# Patient Record
Sex: MALE | URBAN - METROPOLITAN AREA
[De-identification: names, ages, dates, MRNs, and addresses within clinical notes are randomized per-mention and may not be internally consistent; named-entity substitution may affect disease eponyms.]

---

## 2017-08-28 PROBLEM — Z30.09 CONSULTATION FOR STERILIZATION: Status: ACTIVE | Noted: 2017-08-28

## 2018-02-01 ENCOUNTER — APPOINTMENT (OUTPATIENT)
Dept: GENERAL RADIOLOGY | Age: 43
DRG: 897 | End: 2018-02-01
Payer: COMMERCIAL

## 2018-02-01 ENCOUNTER — APPOINTMENT (OUTPATIENT)
Dept: CT IMAGING | Age: 43
DRG: 897 | End: 2018-02-01
Payer: COMMERCIAL

## 2018-02-01 ENCOUNTER — HOSPITAL ENCOUNTER (INPATIENT)
Age: 43
LOS: 1 days | Discharge: HOME | DRG: 897 | End: 2018-02-01
Attending: EMERGENCY MEDICINE | Admitting: INTERNAL MEDICINE
Payer: COMMERCIAL

## 2018-02-01 VITALS
RESPIRATION RATE: 19 BRPM | TEMPERATURE: 98.1 F | BODY MASS INDEX: 24.61 KG/M2 | DIASTOLIC BLOOD PRESSURE: 60 MMHG | SYSTOLIC BLOOD PRESSURE: 126 MMHG | OXYGEN SATURATION: 96 % | WEIGHT: 181.44 LBS | HEART RATE: 96 BPM

## 2018-02-01 DIAGNOSIS — R74.8 ELEVATED CK: ICD-10-CM

## 2018-02-01 DIAGNOSIS — G93.89 CEREBELLAR MASS: ICD-10-CM

## 2018-02-01 DIAGNOSIS — R41.82 ALTERED MENTAL STATUS, UNSPECIFIED ALTERED MENTAL STATUS TYPE: ICD-10-CM

## 2018-02-01 DIAGNOSIS — R79.89 ELEVATED SERUM CREATININE: Primary | ICD-10-CM

## 2018-02-01 DIAGNOSIS — F10.920 ACUTE ALCOHOLIC INTOXICATION WITHOUT COMPLICATION (HCC): ICD-10-CM

## 2018-02-01 PROBLEM — R45.6 VIOLENT BEHAVIOR: Status: ACTIVE | Noted: 2018-02-01

## 2018-02-01 PROBLEM — F10.221 ACUTE ALCOHOL INTOXICATION DELIRIUM WITH MODERATE OR SEVERE USE DISORDER (HCC): Status: ACTIVE | Noted: 2018-02-01

## 2018-02-01 LAB
ABSOLUTE EOS #: 0.16 K/UL (ref 0–0.44)
ABSOLUTE IMMATURE GRANULOCYTE: <0.03 K/UL (ref 0–0.3)
ABSOLUTE LYMPH #: 3.84 K/UL (ref 1.1–3.7)
ABSOLUTE MONO #: 0.62 K/UL (ref 0.1–1.2)
ACETAMINOPHEN LEVEL: <10 UG/ML (ref 10–30)
ALBUMIN SERPL-MCNC: 4.5 G/DL (ref 3.5–5.2)
ALBUMIN SERPL-MCNC: 4.9 G/DL (ref 3.5–5.2)
ALBUMIN/GLOBULIN RATIO: 1.5 (ref 1–2.5)
ALBUMIN/GLOBULIN RATIO: 2.1 (ref 1–2.5)
ALP BLD-CCNC: 44 U/L (ref 40–129)
ALP BLD-CCNC: 52 U/L (ref 40–129)
ALT SERPL-CCNC: 42 U/L (ref 5–41)
ALT SERPL-CCNC: 52 U/L (ref 5–41)
AMMONIA: 50 UMOL/L (ref 16–60)
AMPHETAMINE SCREEN URINE: NEGATIVE
ANION GAP SERPL CALCULATED.3IONS-SCNC: 13 MMOL/L (ref 9–17)
ANION GAP SERPL CALCULATED.3IONS-SCNC: 17 MMOL/L (ref 9–17)
AST SERPL-CCNC: 60 U/L
AST SERPL-CCNC: 89 U/L
BARBITURATE SCREEN URINE: NEGATIVE
BASOPHILS # BLD: 1 % (ref 0–2)
BASOPHILS ABSOLUTE: 0.04 K/UL (ref 0–0.2)
BENZODIAZEPINE SCREEN, URINE: NEGATIVE
BILIRUB SERPL-MCNC: 0.15 MG/DL (ref 0.3–1.2)
BILIRUB SERPL-MCNC: 0.21 MG/DL (ref 0.3–1.2)
BUN BLDV-MCNC: 16 MG/DL (ref 6–20)
BUN BLDV-MCNC: 19 MG/DL (ref 6–20)
BUN/CREAT BLD: ABNORMAL (ref 9–20)
BUN/CREAT BLD: ABNORMAL (ref 9–20)
BUPRENORPHINE URINE: NORMAL
CALCIUM SERPL-MCNC: 8.4 MG/DL (ref 8.6–10.4)
CALCIUM SERPL-MCNC: 9.1 MG/DL (ref 8.6–10.4)
CANNABINOID SCREEN URINE: NEGATIVE
CHLORIDE BLD-SCNC: 103 MMOL/L (ref 98–107)
CHLORIDE BLD-SCNC: 107 MMOL/L (ref 98–107)
CO2: 21 MMOL/L (ref 20–31)
CO2: 24 MMOL/L (ref 20–31)
COCAINE METABOLITE, URINE: NEGATIVE
CREAT SERPL-MCNC: 1.08 MG/DL (ref 0.7–1.2)
CREAT SERPL-MCNC: 1.38 MG/DL (ref 0.7–1.2)
DIFFERENTIAL TYPE: ABNORMAL
EKG ATRIAL RATE: 95 BPM
EKG P AXIS: 72 DEGREES
EKG P-R INTERVAL: 164 MS
EKG Q-T INTERVAL: 352 MS
EKG QRS DURATION: 96 MS
EKG QTC CALCULATION (BAZETT): 442 MS
EKG R AXIS: 43 DEGREES
EKG T AXIS: 52 DEGREES
EKG VENTRICULAR RATE: 95 BPM
EOSINOPHILS RELATIVE PERCENT: 2 % (ref 1–4)
ESTIMATED AVERAGE GLUCOSE: 117 MG/DL
ETHANOL PERCENT: 0.23 %
ETHANOL PERCENT: 0.31 %
ETHANOL: 226 MG/DL
ETHANOL: 306 MG/DL
GFR AFRICAN AMERICAN: >60 ML/MIN
GFR AFRICAN AMERICAN: >60 ML/MIN
GFR NON-AFRICAN AMERICAN: 57 ML/MIN
GFR NON-AFRICAN AMERICAN: >60 ML/MIN
GFR SERPL CREATININE-BSD FRML MDRD: ABNORMAL ML/MIN/{1.73_M2}
GLUCOSE BLD-MCNC: 117 MG/DL (ref 70–99)
GLUCOSE BLD-MCNC: 92 MG/DL (ref 70–99)
HBA1C MFR BLD: 5.7 % (ref 4–6)
HCT VFR BLD CALC: 41.9 % (ref 40.7–50.3)
HCT VFR BLD CALC: 47.7 % (ref 40.7–50.3)
HEMOGLOBIN: 14 G/DL (ref 13–17)
HEMOGLOBIN: 15.8 G/DL (ref 13–17)
IMMATURE GRANULOCYTES: 0 %
INR BLD: 1
INR BLD: 1
LACTIC ACID, WHOLE BLOOD: 2.5 MMOL/L (ref 0.7–2.1)
LACTIC ACID, WHOLE BLOOD: 4.2 MMOL/L (ref 0.7–2.1)
LYMPHOCYTES # BLD: 44 % (ref 24–43)
MCH RBC QN AUTO: 28.6 PG (ref 25.2–33.5)
MCH RBC QN AUTO: 29 PG (ref 25.2–33.5)
MCHC RBC AUTO-ENTMCNC: 33.1 G/DL (ref 28.4–34.8)
MCHC RBC AUTO-ENTMCNC: 33.4 G/DL (ref 28.4–34.8)
MCV RBC AUTO: 86.4 FL (ref 82.6–102.9)
MCV RBC AUTO: 86.7 FL (ref 82.6–102.9)
MDMA URINE: NORMAL
METHADONE SCREEN, URINE: NEGATIVE
METHAMPHETAMINE, URINE: NORMAL
MONOCYTES # BLD: 7 % (ref 3–12)
MYOGLOBIN: 380 NG/ML (ref 28–72)
MYOGLOBIN: 407 NG/ML (ref 28–72)
NRBC AUTOMATED: 0 PER 100 WBC
NRBC AUTOMATED: 0 PER 100 WBC
OPIATES, URINE: NEGATIVE
OXYCODONE SCREEN URINE: NEGATIVE
PDW BLD-RTO: 12.9 % (ref 11.8–14.4)
PDW BLD-RTO: 12.9 % (ref 11.8–14.4)
PHENCYCLIDINE, URINE: NEGATIVE
PHOSPHORUS: 3.4 MG/DL (ref 2.5–4.5)
PLATELET # BLD: 250 K/UL (ref 138–453)
PLATELET # BLD: 322 K/UL (ref 138–453)
PLATELET ESTIMATE: ABNORMAL
PMV BLD AUTO: 9.3 FL (ref 8.1–13.5)
PMV BLD AUTO: 9.6 FL (ref 8.1–13.5)
POC TROPONIN I: 0 NG/ML (ref 0–0.1)
POC TROPONIN INTERP: NORMAL
POTASSIUM SERPL-SCNC: 4.4 MMOL/L (ref 3.7–5.3)
POTASSIUM SERPL-SCNC: 5.3 MMOL/L (ref 3.7–5.3)
PROPOXYPHENE, URINE: NORMAL
PROTHROMBIN TIME: 10.6 SEC (ref 9.4–12.6)
PROTHROMBIN TIME: 10.9 SEC (ref 9.4–12.6)
RBC # BLD: 4.83 M/UL (ref 4.21–5.77)
RBC # BLD: 5.52 M/UL (ref 4.21–5.77)
RBC # BLD: ABNORMAL 10*6/UL
SALICYLATE LEVEL: <1 MG/DL (ref 3–10)
SEG NEUTROPHILS: 46 % (ref 36–65)
SEGMENTED NEUTROPHILS ABSOLUTE COUNT: 4.04 K/UL (ref 1.5–8.1)
SODIUM BLD-SCNC: 141 MMOL/L (ref 135–144)
SODIUM BLD-SCNC: 144 MMOL/L (ref 135–144)
TEST INFORMATION: NORMAL
TOTAL CK: 1809 U/L (ref 39–308)
TOTAL CK: 1945 U/L (ref 39–308)
TOTAL PROTEIN: 6.6 G/DL (ref 6.4–8.3)
TOTAL PROTEIN: 8.2 G/DL (ref 6.4–8.3)
TOXIC TRICYCLIC SC,BLOOD: NEGATIVE
TRICYCLIC ANTIDEPRESSANTS, UR: NORMAL
TROPONIN INTERP: NORMAL
TROPONIN T: <0.03 NG/ML
TSH SERPL DL<=0.05 MIU/L-ACNC: 2.31 MIU/L (ref 0.3–5)
WBC # BLD: 11 K/UL (ref 3.5–11.3)
WBC # BLD: 8.7 K/UL (ref 3.5–11.3)
WBC # BLD: ABNORMAL 10*3/UL

## 2018-02-01 PROCEDURE — 6360000002 HC RX W HCPCS: Performed by: EMERGENCY MEDICINE

## 2018-02-01 PROCEDURE — 93005 ELECTROCARDIOGRAM TRACING: CPT

## 2018-02-01 PROCEDURE — 2580000003 HC RX 258: Performed by: EMERGENCY MEDICINE

## 2018-02-01 PROCEDURE — G0480 DRUG TEST DEF 1-7 CLASSES: HCPCS

## 2018-02-01 PROCEDURE — 80307 DRUG TEST PRSMV CHEM ANLYZR: CPT

## 2018-02-01 PROCEDURE — 83605 ASSAY OF LACTIC ACID: CPT

## 2018-02-01 PROCEDURE — 83036 HEMOGLOBIN GLYCOSYLATED A1C: CPT

## 2018-02-01 PROCEDURE — 99285 EMERGENCY DEPT VISIT HI MDM: CPT

## 2018-02-01 PROCEDURE — 99222 1ST HOSP IP/OBS MODERATE 55: CPT | Performed by: INTERNAL MEDICINE

## 2018-02-01 PROCEDURE — 70450 CT HEAD/BRAIN W/O DYE: CPT

## 2018-02-01 PROCEDURE — 6370000000 HC RX 637 (ALT 250 FOR IP): Performed by: NURSE PRACTITIONER

## 2018-02-01 PROCEDURE — 80053 COMPREHEN METABOLIC PANEL: CPT

## 2018-02-01 PROCEDURE — 6360000002 HC RX W HCPCS

## 2018-02-01 PROCEDURE — 6360000002 HC RX W HCPCS: Performed by: NURSE PRACTITIONER

## 2018-02-01 PROCEDURE — 2500000003 HC RX 250 WO HCPCS: Performed by: NURSE PRACTITIONER

## 2018-02-01 PROCEDURE — 82550 ASSAY OF CK (CPK): CPT

## 2018-02-01 PROCEDURE — 99254 IP/OBS CNSLTJ NEW/EST MOD 60: CPT | Performed by: PSYCHIATRY & NEUROLOGY

## 2018-02-01 PROCEDURE — 2580000003 HC RX 258: Performed by: NURSE PRACTITIONER

## 2018-02-01 PROCEDURE — 82140 ASSAY OF AMMONIA: CPT

## 2018-02-01 PROCEDURE — 85025 COMPLETE CBC W/AUTO DIFF WBC: CPT

## 2018-02-01 PROCEDURE — 85610 PROTHROMBIN TIME: CPT

## 2018-02-01 PROCEDURE — 83874 ASSAY OF MYOGLOBIN: CPT

## 2018-02-01 PROCEDURE — 73562 X-RAY EXAM OF KNEE 3: CPT

## 2018-02-01 PROCEDURE — 84443 ASSAY THYROID STIM HORMONE: CPT

## 2018-02-01 PROCEDURE — 84484 ASSAY OF TROPONIN QUANT: CPT

## 2018-02-01 PROCEDURE — 71045 X-RAY EXAM CHEST 1 VIEW: CPT

## 2018-02-01 PROCEDURE — 85027 COMPLETE CBC AUTOMATED: CPT

## 2018-02-01 PROCEDURE — 72125 CT NECK SPINE W/O DYE: CPT

## 2018-02-01 PROCEDURE — 94762 N-INVAS EAR/PLS OXIMTRY CONT: CPT

## 2018-02-01 PROCEDURE — 84100 ASSAY OF PHOSPHORUS: CPT

## 2018-02-01 PROCEDURE — 36415 COLL VENOUS BLD VENIPUNCTURE: CPT

## 2018-02-01 PROCEDURE — 2060000000 HC ICU INTERMEDIATE R&B

## 2018-02-01 RX ORDER — POTASSIUM CHLORIDE 20 MEQ/1
40 TABLET, EXTENDED RELEASE ORAL PRN
Status: DISCONTINUED | OUTPATIENT
Start: 2018-02-01 | End: 2018-02-01 | Stop reason: HOSPADM

## 2018-02-01 RX ORDER — LORAZEPAM 2 MG/ML
3 INJECTION INTRAMUSCULAR
Status: DISCONTINUED | OUTPATIENT
Start: 2018-02-01 | End: 2018-02-01 | Stop reason: HOSPADM

## 2018-02-01 RX ORDER — HALOPERIDOL 5 MG/ML
2 INJECTION INTRAMUSCULAR EVERY 6 HOURS PRN
Status: DISCONTINUED | OUTPATIENT
Start: 2018-02-01 | End: 2018-02-01 | Stop reason: HOSPADM

## 2018-02-01 RX ORDER — POTASSIUM CHLORIDE 20MEQ/15ML
40 LIQUID (ML) ORAL PRN
Status: DISCONTINUED | OUTPATIENT
Start: 2018-02-01 | End: 2018-02-01 | Stop reason: HOSPADM

## 2018-02-01 RX ORDER — LORAZEPAM 0.5 MG/1
1 TABLET ORAL
Status: DISCONTINUED | OUTPATIENT
Start: 2018-02-01 | End: 2018-02-01 | Stop reason: HOSPADM

## 2018-02-01 RX ORDER — HALOPERIDOL 5 MG/ML
INJECTION INTRAMUSCULAR
Status: COMPLETED
Start: 2018-02-01 | End: 2018-02-01

## 2018-02-01 RX ORDER — LORAZEPAM 2 MG/1
4 TABLET ORAL
Status: DISCONTINUED | OUTPATIENT
Start: 2018-02-01 | End: 2018-02-01 | Stop reason: HOSPADM

## 2018-02-01 RX ORDER — POTASSIUM CHLORIDE 7.45 MG/ML
10 INJECTION INTRAVENOUS PRN
Status: DISCONTINUED | OUTPATIENT
Start: 2018-02-01 | End: 2018-02-01 | Stop reason: HOSPADM

## 2018-02-01 RX ORDER — 0.9 % SODIUM CHLORIDE 0.9 %
1000 INTRAVENOUS SOLUTION INTRAVENOUS ONCE
Status: COMPLETED | OUTPATIENT
Start: 2018-02-01 | End: 2018-02-01

## 2018-02-01 RX ORDER — FOLIC ACID 5 MG/ML
2 INJECTION, SOLUTION INTRAMUSCULAR; INTRAVENOUS; SUBCUTANEOUS ONCE
Status: DISCONTINUED | OUTPATIENT
Start: 2018-02-01 | End: 2018-02-01 | Stop reason: SDUPTHER

## 2018-02-01 RX ORDER — NICOTINE 21 MG/24HR
1 PATCH, TRANSDERMAL 24 HOURS TRANSDERMAL DAILY
Status: DISCONTINUED | OUTPATIENT
Start: 2018-02-01 | End: 2018-02-01 | Stop reason: HOSPADM

## 2018-02-01 RX ORDER — LORAZEPAM 2 MG/ML
1 INJECTION INTRAMUSCULAR
Status: DISCONTINUED | OUTPATIENT
Start: 2018-02-01 | End: 2018-02-01 | Stop reason: HOSPADM

## 2018-02-01 RX ORDER — SODIUM CHLORIDE 9 MG/ML
INJECTION, SOLUTION INTRAVENOUS CONTINUOUS
Status: DISCONTINUED | OUTPATIENT
Start: 2018-02-01 | End: 2018-02-01 | Stop reason: HOSPADM

## 2018-02-01 RX ORDER — SODIUM CHLORIDE 9 MG/ML
INJECTION, SOLUTION INTRAVENOUS CONTINUOUS
Status: DISCONTINUED | OUTPATIENT
Start: 2018-02-01 | End: 2018-02-01

## 2018-02-01 RX ORDER — BISACODYL 10 MG
10 SUPPOSITORY, RECTAL RECTAL DAILY PRN
Status: DISCONTINUED | OUTPATIENT
Start: 2018-02-01 | End: 2018-02-01 | Stop reason: HOSPADM

## 2018-02-01 RX ORDER — LORAZEPAM 2 MG/ML
2 INJECTION INTRAMUSCULAR
Status: DISCONTINUED | OUTPATIENT
Start: 2018-02-01 | End: 2018-02-01 | Stop reason: HOSPADM

## 2018-02-01 RX ORDER — ONDANSETRON 2 MG/ML
4 INJECTION INTRAMUSCULAR; INTRAVENOUS EVERY 6 HOURS PRN
Status: DISCONTINUED | OUTPATIENT
Start: 2018-02-01 | End: 2018-02-01 | Stop reason: HOSPADM

## 2018-02-01 RX ORDER — LORAZEPAM 2 MG/1
2 TABLET ORAL
Status: DISCONTINUED | OUTPATIENT
Start: 2018-02-01 | End: 2018-02-01 | Stop reason: HOSPADM

## 2018-02-01 RX ORDER — LORAZEPAM 2 MG/ML
4 INJECTION INTRAMUSCULAR
Status: DISCONTINUED | OUTPATIENT
Start: 2018-02-01 | End: 2018-02-01 | Stop reason: HOSPADM

## 2018-02-01 RX ORDER — MAGNESIUM SULFATE 1 G/100ML
2 INJECTION INTRAVENOUS ONCE
Status: COMPLETED | OUTPATIENT
Start: 2018-02-01 | End: 2018-02-01

## 2018-02-01 RX ORDER — LORAZEPAM 2 MG/ML
INJECTION INTRAMUSCULAR
Status: COMPLETED
Start: 2018-02-01 | End: 2018-02-01

## 2018-02-01 RX ORDER — LORAZEPAM 2 MG/ML
2 INJECTION INTRAMUSCULAR EVERY 4 HOURS PRN
Status: DISCONTINUED | OUTPATIENT
Start: 2018-02-01 | End: 2018-02-01 | Stop reason: HOSPADM

## 2018-02-01 RX ADMIN — HALOPERIDOL LACTATE 5 MG: 5 INJECTION, SOLUTION INTRAMUSCULAR at 02:35

## 2018-02-01 RX ADMIN — MAGNESIUM SULFATE HEPTAHYDRATE 2 G: 1 INJECTION, SOLUTION INTRAVENOUS at 05:19

## 2018-02-01 RX ADMIN — SODIUM CHLORIDE 1000 ML: 9 INJECTION, SOLUTION INTRAVENOUS at 02:48

## 2018-02-01 RX ADMIN — FOLIC ACID: 5 INJECTION, SOLUTION INTRAMUSCULAR; INTRAVENOUS; SUBCUTANEOUS at 09:17

## 2018-02-01 RX ADMIN — SODIUM CHLORIDE 1000 ML: 9 INJECTION, SOLUTION INTRAVENOUS at 04:30

## 2018-02-01 RX ADMIN — PHENYTOIN SODIUM 1000 MG: 50 INJECTION INTRAMUSCULAR; INTRAVENOUS at 07:05

## 2018-02-01 RX ADMIN — SODIUM CHLORIDE: 9 INJECTION, SOLUTION INTRAVENOUS at 07:04

## 2018-02-01 RX ADMIN — LORAZEPAM 2 MG: 2 INJECTION INTRAMUSCULAR at 02:35

## 2018-02-01 RX ADMIN — ENOXAPARIN SODIUM 40 MG: 40 INJECTION SUBCUTANEOUS at 09:17

## 2018-02-01 ASSESSMENT — ENCOUNTER SYMPTOMS
NAUSEA: 0
CONSTIPATION: 0
COUGH: 0
STRIDOR: 0
SHORTNESS OF BREATH: 0
SORE THROAT: 0
WHEEZING: 0
ABDOMINAL PAIN: 0
EYE DISCHARGE: 0
ABDOMINAL DISTENTION: 0
EYE REDNESS: 0
BACK PAIN: 0
VOICE CHANGE: 0
VOMITING: 0
DIARRHEA: 0

## 2018-02-01 NOTE — ED PROVIDER NOTES
[facility]  ED  Emergency Department Encounter  Emergency Medicine Resident     Pt Name: Bisi Elizabeth  MRN: [id number]  [place] 1975  Date of evaluation: 2/1/18  PCP:  No primary care provider on file. CHIEF COMPLAINT       Chief Complaint   Patient presents with    Manic Behavior     uncooperative       HISTORY OF PRESENT ILLNESS  (Location/Symptom, Timing/Onset, Context/Setting, Quality, Duration, Modifying Factors, Severity.)      Bisi Elizabeth is a 38 y.o. male who presents With altered mental status. Patient was found running around in his underwear in a apartment complex and was brought in by [facility]. Patient himself is belligerent on arrival, fighting, kicking, spitting on personnel, threatening staff. Patient not answering questions, refusing that he took any drugs. Denies any K2 or cocaine use. Patient response to most questions was \"to go suck a crissy\", hence it was difficult to get a full history from the patient. Denies any chest pain however. Denies any shortness of breath. No nausea vomiting or abd pain. Did not answer any other questions. Unknown if patient took any substances. Afebrile in the emergency department. PAST MEDICAL / SURGICAL / SOCIAL / FAMILY HISTORY      has a past medical history of Asthma and Caffeine use.     has a past surgical history that includes knee surgery. Social History     Social History    Marital status: Single     Spouse name: N/A    Number of children: N/A    Years of education: N/A     Occupational History    Not on file. Social History Main Topics    Smoking status: Current Every Day Smoker    Smokeless tobacco: Current User    Alcohol use No    Drug use: No    Sexual activity: Yes     Other Topics Concern    Not on file     Social History Narrative    No narrative on file       History reviewed. No pertinent family history.     Allergies:  Review of patient's allergies indicates no known allergies. Home Medications:  Prior to Admission medications    Medication Sig Start Date End Date Taking? Authorizing Provider   HYDROcodone-acetaminophen (NORCO) 7.5-325 MG per tablet Take 1 tablet by mouth every 6 hours as needed for Pain . 9/22/17   Arianna Andrews MD   ibuprofen (ADVIL;MOTRIN) 800 MG tablet Take 1 tablet by mouth every 8 hours as needed for Pain Take 1 tab 1/2 prior to procedure and then every 8 hours after PRN 8/28/17   Arianna Andrews MD   cephALEXin (KEFLEX) 500 MG capsule Take 1 capsule by mouth 3 times daily 8/28/17   Arianna Andrews MD       REVIEW OF SYSTEMS    (2-9 systems for level 4, 10 or more for level 5)      Review of Systems   Unable to perform ROS: Mental status change   Constitutional: Negative for fever. Respiratory: Negative for shortness of breath. Cardiovascular: Negative for chest pain. Gastrointestinal: Negative for abdominal pain, nausea and vomiting. Neurological: Negative for weakness. Psychiatric/Behavioral: Positive for agitation. Limited ROS 2/2 altered mentation. PHYSICAL EXAM   (up to 7 for level 4, 8 or more for level 5)      INITIAL VITALS:   BP (!) 149/75   Pulse 94   Temp 97.6 °F (36.4 °C) (Oral)   Resp 17   SpO2 95%     Physical Exam   Constitutional: He appears well-nourished. No distress. Agitated patient who is fighting the staff, yelling at staff, attempting to bite. HENT:   Head: Normocephalic and atraumatic. Eyes: EOM are normal.   Cardiovascular: Regular rhythm and normal heart sounds. Exam reveals no gallop and no friction rub. No murmur heard. Tachy   Pulmonary/Chest: Effort normal and breath sounds normal. No respiratory distress. He has no wheezes. He has no rales. Abdominal: Soft. He exhibits no distension. There is no tenderness. There is no rebound and no guarding. Musculoskeletal: He exhibits no tenderness. Neurological:   5 out of 5 strength bilateral upper and lower extremities.

## 2018-02-01 NOTE — CARE COORDINATION
Case Management Initial Discharge Plan  Ru Wheeler,         Readmission Risk              Readmission Risk:        0       Age 72 or Greater:  0    Admitted from SNF or Requires Paid or Family Care:      Currently has CHF,COPD,ARF,CRI,or is on dialysis:      Takes more than 5 Prescription Medications:      Takes Digoxin,Insulin,Anticoagulants,Narcotics or ASA/Plavix:      Hospital Admit in Past 12 Months:      On Disability:      Patient Considers own Health:              Met with:patient to discuss discharge plans. Information verified: address, contacts, phone number, , insurance Yes  PCP: No primary care provider on file. Date of last visit: New PCP appt at [facility] made for  @ 3pm    Insurance Provider: [facility]    Discharge Planning  Current Residence:  Private Residence  Living Arrangements:  LAVERNE Ga has  stories/ stairs to climb  Support Systems:     Current Services PTA:  None Supplier:   Patient able to perform ADL's:Independent  DME used to aid ambulation prior to admission: n/a/during admission    Potential Assistance Needed:  Other (Comment) (PCP)    Pharmacy:    Potential Assistance Purchasing Medications:     Does patient want to participate in local refill/ meds to beds program?       Patient agreeable to home care: n/a  Freedom of choice provided:  n/a      Type of Home Care Services:     Patient expects to be discharged to:  home    Prior SNF/Rehab Placement and Facility: n/a  Agreeable to SNF/Rehab: n/a  [place] of choice provided: n/a   Evaluation: no    Expected Discharge date: Follow Up Appointment: Best Day/ Time:      Transportation provider:   Transportation arrangements needed for discharge:     Discharge Plan: Home independently. Pt to follow up outpatient for MRI.   New PCP appt made at [facility] for 2018 @ 3pm.        Electronically signed by Michael Salmeron RN on 18 at 5:24 PM

## 2018-02-01 NOTE — CONSULTS
Department of Neurosurgery                                       Resident Consult Note      Reason for Consult:  Possible cerebral mass  Requesting Physician:  Dr. Angelo Hernandez:   []Dr. Rita Allen  []Dr. Griselda Carreon  [x]Dr. Stephanie Nj    History Obtained From:  Chart review    CHIEF COMPLAINT:         Manic behavior    HISTORY OF PRESENT ILLNESS:       The patient is a 38 y.o. male who was apparently found altered in his apartment. Per TPD patient was running around is under at his apartment complex behaving erratically. On arrival to the ED, patient was belligerent, uncooperative, fighting, kicking, and spitting, threatening the staff. Patient was given Ativan and Haldol with improvement in symptoms. Patient denied any drug use and due to altered mental status was not able to give much history when being examined by the ED physician. At that time patient did not have any complaints. Evaluation patient is sleeping and cannot be aroused. All other history could be obtained at this time. In the ED patient had a CT head done given his altered mental status and was found to have a hypodense focus in the left cerebellar hemisphere concerning for infarct versus intracranial mass. Patient also found to have elevated CK, myoglobin, lactic acid, and creatinine elevated at 1.38. Patient also had an elevated alcohol level above 300. Was given 2 L boluses of normal saline.       PAST MEDICAL HISTORY :       Past Medical History:        Diagnosis Date    Asthma     Caffeine use     3 COFFEE/DAY       Past Surgical History:        Procedure Laterality Date    KNEE SURGERY         Social History:   Social History     Social History    Marital status: Single     Spouse name: N/A    Number of children: N/A    Years of education: N/A     Occupational History    Not on file.      Social History Main Topics    Smoking status: Current Every Day Smoker    Smokeless tobacco: Current User    Alcohol mg, 2 mg, Intravenous, Q1H PRN **OR** LORazepam (ATIVAN) tablet 3 mg, 3 mg, Oral, Q1H PRN **OR** LORazepam (ATIVAN) injection 3 mg, 3 mg, Intravenous, Q1H PRN **OR** LORazepam (ATIVAN) tablet 4 mg, 4 mg, Oral, Q1H PRN **OR** LORazepam (ATIVAN) injection 4 mg, 4 mg, Intravenous, Q1H PRN  haloperidol lactate (HALDOL) injection 2 mg, 2 mg, Intramuscular, Q6H PRN  sodium chloride 0.9 % 5,419 mL with folic acid 1 mg, Multi-Vitamin with vitamin k 10 mL, thiamine 100 mg, , Intravenous, Once  0.9 % sodium chloride infusion, , Intravenous, Continuous    REVIEW OF SYSTEMS:     Unable to obtain given patient is sedated    Review of systems otherwise negative. PHYSICAL EXAM:       /74   Pulse 97   Temp 98.3 °F (36.8 °C) (Axillary)   Resp 17   Wt 181 lb 7 oz (82.3 kg)   SpO2 94%   BMI 24.61 kg/m²   Unable to obtain a full exam given that patient is sedated. Patient is able to maintain airway with stable vitals on monitor. LABS AND IMAGING:     CBC with Differential:    Lab Results   Component Value Date    WBC 11.0 02/01/2018    RBC 4.83 02/01/2018    HGB 14.0 02/01/2018    HCT 41.9 02/01/2018     02/01/2018    MCV 86.7 02/01/2018    MCH 29.0 02/01/2018    MCHC 33.4 02/01/2018    RDW 12.9 02/01/2018    LYMPHOPCT 44 02/01/2018    MONOPCT 7 02/01/2018    BASOPCT 1 02/01/2018    MONOSABS 0.62 02/01/2018    LYMPHSABS 3.84 02/01/2018    EOSABS 0.16 02/01/2018    BASOSABS 0.04 02/01/2018    DIFFTYPE NOT REPORTED 02/01/2018     BMP:    Lab Results   Component Value Date     02/01/2018    K 4.4 02/01/2018     02/01/2018    CO2 24 02/01/2018    BUN 16 02/01/2018    LABALBU 4.5 02/01/2018    CREATININE 1.08 02/01/2018    CALCIUM 8.4 02/01/2018    GFRAA >60 02/01/2018    LABGLOM >60 02/01/2018    GLUCOSE 92 02/01/2018       Radiology Review:    Xr Knee Right (3 Views)    Result Date: 2/1/2018  EXAMINATION: 3 VIEWS OF THE RIGHT KNEE 2/1/2018 2:58 am COMPARISON: None.  HISTORY: ORDERING SYSTEM PROVIDED this lesion, an intracranial mass could also have this appearance. An MRI of the brain with and without contrast is recommended for further evaluation. No acute intracranial hemorrhage identified. Ct Cervical Spine Wo Contrast    Result Date: 2/1/2018  EXAMINATION: CT OF THE CERVICAL SPINE WITHOUT CONTRAST 2/1/2018 3:11 am TECHNIQUE: CT of the cervical spine was performed without the administration of intravenous contrast. Multiplanar reformatted images are provided for review. Dose modulation, iterative reconstruction, and/or weight based adjustment of the mA/kV was utilized to reduce the radiation dose to as low as reasonably achievable. COMPARISON: None. HISTORY: ORDERING SYSTEM PROVIDED HISTORY: Altered mental status FINDINGS: BONES/ALIGNMENT: There is no evidence of an acute cervical spine fracture. There is normal alignment of the cervical spine. DEGENERATIVE CHANGES: Mild disc height loss in the midcervical spine. No significant central canal stenosis. SOFT TISSUES: There is no prevertebral soft tissue swelling. No acute abnormality of the cervical spine. Xr Chest Portable    Result Date: 2/1/2018  EXAMINATION: SINGLE VIEW OF THE CHEST 2/1/2018 2:58 am COMPARISON: None. HISTORY: ORDERING SYSTEM PROVIDED HISTORY: Altered mental status TECHNOLOGIST PROVIDED HISTORY: Reason for exam:->Altered mental status FINDINGS: The mediastinal and cardiac contours are normal.  The lungs are clear. There is no focal consolidation, pleural effusion or pneumothorax evident. A remote right clavicle fracture is noted. No acute cardiopulmonary process identified. ASSESSMENT AND PLAN:       Patient Active Problem List   Diagnosis    Consultation for sterilization    Altered mental status         38 y.o. male who presents with altered mental status on to have elevated CK, myoglobin, lactic acid, creatinine, and alcohol level. No other history could be obtained.   Patient sedated with Haldol and Ativan for agitation and for being a danger to himself and staff. Patient care will be discussed with attending, will reevaluate patient along with attending     - No neurosurgical interventions planned for now   - Will follow up MRI with and without contrast as well as MRI with and without contrast of head when creatinine  Improved   - Will also follow up EEG  - CTLS recommendations: none  - HOB: 30 degrees   - Neuro checks per protocol  - Hold all antiplatelets and anticoagulants   - Will re-evaluate pt when he is awake and sober    Additional recommendations may follow    Please contact neurosurgery with any changes in patients neurologic status. Thank you for your consult.        Tonya Gallardo MD   NS pager [phone]  2/1/2018  7:40 AM

## 2018-02-01 NOTE — ED PROVIDER NOTES
[facility]     Emergency Department     Faculty Attestation    I performed a history and physical examination of the patient and discussed management with the resident. I reviewed the residents note and agree with the documented findings and plan of care. Any areas of disagreement are noted on the chart. I was personally present for the key portions of any procedures. I have documented in the chart those procedures where I was not present during the key portions. I have reviewed the emergency nurses triage note. I agree with the chief complaint, past medical history, past surgical history, allergies, medications, social and family history as documented unless otherwise noted below. For Physician Assistant/ Nurse Practitioner cases/documentation I have personally evaluated this patient and have completed at least one if not all key elements of the E/M (history, physical exam, and MDM). Additional findings are as noted. Patient brought in by police combative, responded to Haldol, good airway, equal breath sounds, patient is sleeping at this time.     Randell Moore MD  Attending Emergency  Physician              Prieto Membreno MD  02/01/18 [id number]    EKG Interpretation    Interpreted by me    Rhythm: normal sinus   Rate: normal  Axis: normal  Ectopy: none  Conduction: normal  ST Segments: no acute change  T Waves: no acute change  Q Waves: none    Clinical Impression: no acute changes and normal EKG       Prieto Membreno MD  02/01/18 0042

## 2018-02-01 NOTE — H&P
[address]    HISTORY AND PHYSICAL EXAMINATION            Date:   2/1/2018  Patient name:  Jessi Gallardo  Date of admission:  2/1/2018  2:13 AM  MRN:   [id number]  Account:  [de-identified]  YOB: 1975  PCP:    No primary care provider on file. Room:   [address] [place] [address]  Code Status:    Full Code    Chief Complaint:     Chief Complaint   Patient presents with    Manic Behavior     uncooperative       History Obtained From:     electronic medical record    History of Present Illness: The patient is a 38 y.o. Unavailable/unknown male who presents with Manic Behavior (uncooperative)   and he is admitted to the hospital for the management of Alcohol intoxication. He presents with the following:     \"was apparently found altered in his apartment.  Per [employer] patient was running around is under at his apartment complex behaving erratically.  On arrival to the ED, patient was belligerent, uncooperative, fighting, kicking, and spitting, threatening the staff. Gamaliel Ulrich was given Ativan and Haldol with improvement in symptoms.  Patient denied any drug use and due to altered mental status was not able to give much history when being examined by the ED physician. Regina David that time patient did not have any complaints.  Evaluation patient is sleeping and cannot be aroused. All other history could be obtained at this time. In the ED patient had a CT head done given his altered mental status and was found to have a hypodense focus in the left cerebellar hemisphere concerning for infarct versus intracranial mass.  Patient also found to have elevated CK, myoglobin, lactic acid, and creatinine elevated at 1.38.  Patient also had an elevated alcohol level above 300.  Was given 2 L boluses of normal saline. \"    In the ED, he was seen by the endovascular team as well as Neurosurgery.  Given 2 g magnesium sulfate IV, folic acid, thiamine, dlantin loading dose. MRI and EEG ordered. During our encounter, patient was in 4 point leather restraints as he had arrived to the unit kicking, biting, and displaying acute aggression. During encounter, sitter was by bedside. Patient relayed that he recently moved to [place] from [facility]. He has no family here. He has no chronic medical issues and is on no home medications. He was adopted and unaware of any family history of psychiatric or neurologic conditions. He took xanax and bourbon last night. He denied taking any recreational drugs and has never done so in the past. He denied Heroin, cocaine, PCP, Bath salt use now or in the past. He does not have a family doctor. He saw a chiropractor yesterday for injuries sustained while dead-lifting. Instructed on better form. He denies any previous head trauma, MVA, concussion, stroke in past.     Past Medical History:     Past Medical History:   Diagnosis Date    Asthma     Caffeine use     3 COFFEE/DAY        Past Surgical History:     Past Surgical History:   Procedure Laterality Date    KNEE SURGERY          Medications Prior to Admission:     Prior to Admission medications    Medication Sig Start Date End Date Taking? Authorizing Provider   HYDROcodone-acetaminophen (NORCO) 7.5-325 MG per tablet Take 1 tablet by mouth every 6 hours as needed for Pain . 9/22/17   Sameer Santamaria MD   ibuprofen (ADVIL;MOTRIN) 800 MG tablet Take 1 tablet by mouth every 8 hours as needed for Pain Take 1 tab 1/2 prior to procedure and then every 8 hours after PRN 8/28/17   Sameer Santamaria MD   cephALEXin (KEFLEX) 500 MG capsule Take 1 capsule by mouth 3 times daily 8/28/17   Sameer Santamaria MD        Allergies:     Review of patient's allergies indicates no known allergies. Social History:     Tobacco:    reports that he has been smoking. He uses smokeless tobacco.  Alcohol:      reports that he does not drink alcohol. Drug Use:  reports that he does not use drugs.     Family Troponin T <0.03 <0.03 ng/mL    Troponin Interp         DRUG SCREEN MULTI URINE    Collection Time: 02/01/18  3:43 AM   Result Value Ref Range    Amphetamine Screen, Ur NEGATIVE NEG    Barbiturate Screen, Ur NEGATIVE NEG    Benzodiazepine Screen, Urine NEGATIVE NEG    Cocaine Metabolite, Urine NEGATIVE NEG    Methadone Screen, Urine NEGATIVE NEG    Opiates, Urine NEGATIVE NEG    Phencyclidine, Urine NEGATIVE NEG    Propoxyphene, Urine NOT REPORTED NEG    Cannabinoid Scrn, Ur NEGATIVE NEG    Oxycodone Screen, Ur NEGATIVE NEG    Methamphetamine, Urine NOT REPORTED NEG    Tricyclic Antidepressants, Ur NOT REPORTED NEG    MDMA URINE NOT REPORTED NEG    Buprenorphine Urine NOT REPORTED NEG    Test Information       Assay provides medical screening only.   The absence of expected drug(s) and/or   Lactic Acid, Whole Blood    Collection Time: 02/01/18  6:38 AM   Result Value Ref Range    Lactic Acid, Whole Blood 2.5 (H) 0.7 - 2.1 mmol/L   ETHANOL    Collection Time: 02/01/18  6:38 AM   Result Value Ref Range    Ethanol 226 (H) <10 mg/dL    Ethanol percent 0.226 %   CBC    Collection Time: 02/01/18  6:38 AM   Result Value Ref Range    WBC 11.0 3.5 - 11.3 k/uL    RBC 4.83 4.21 - 5.77 m/uL    Hemoglobin 14.0 13.0 - 17.0 g/dL    Hematocrit 41.9 40.7 - 50.3 %    MCV 86.7 82.6 - 102.9 fL    MCH 29.0 25.2 - 33.5 pg    MCHC 33.4 28.4 - 34.8 g/dL    RDW 12.9 11.8 - 14.4 %    Platelets 775 417 - 342 k/uL    MPV 9.6 8.1 - 13.5 fL    NRBC Automated 0.0 0.0 per 100 WBC   Comprehensive Metabolic Panel w/ Reflex to MG    Collection Time: 02/01/18  6:38 AM   Result Value Ref Range    Glucose 92 70 - 99 mg/dL    BUN 16 6 - 20 mg/dL    CREATININE 1.08 0.70 - 1.20 mg/dL    Bun/Cre Ratio NOT REPORTED 9 - 20    Calcium 8.4 (L) 8.6 - 10.4 mg/dL    Sodium 144 135 - 144 mmol/L    Potassium 4.4 3.7 - 5.3 mmol/L    Chloride 107 98 - 107 mmol/L    CO2 24 20 - 31 mmol/L    Anion Gap 13 9 - 17 mmol/L    Alkaline Phosphatase 44 40 - 129 U/L    ALT 42 (H) or severe use disorder [facility])    Active Hospital Problems    Diagnosis Date Noted    Altered mental status [R41.82] 02/01/2018    Acute alcohol intoxication delirium with moderate or severe use disorder ([facility] [address] [F10.221] 02/01/2018    Cerebellar mass [G93.89] 02/01/2018    Violent behavior [R45.6] 02/01/2018       Plan:     Patient status Admit as inpatient in the  Med/Surge    Acute Alcohol Intoxication. Ethanol of 226. Percent: 0.226. Awaiting repeat level. Patient acutely agitated/violent. In 4 point violent restraints. U tox negative, though patient admits to having had xanax. PRN haldol and ativan for agitation/seziures. Sitter by bedside. Loaded with dilantin. EEG monitoring. New Finding of cerebellar lesion. Acute/subacute stroke vs mass. Neurology consulted. MRI pending. Elevated Lactic Acid. Likely 2/2 Dehydration. Getting repeat Lactic Acid. Elevated Total CK, Myoglobin. Likely 2/2 acute intoxication/agitation. Monitor with repeat CK in AM.     Social Work consulted for help with EtOH abuse/resources of rehab on discharge. Tobacco Abuse Disorder. PRN Nicotine patch. Will  on cessation once patient more cooperative. DVT PPx. SQ lovenox. Consultations:   IP CONSULT TO HOSPITALIST  IP CONSULT TO NEUROSURGERY  IP CONSULT TO NEUROLOGY  IP CONSULT TO DIETITIAN  IP CONSULT TO SOCIAL WORK  IP CONSULT TO SPIRITUAL SERVICES  IP CONSULT TO NEUROLOGY  IP CONSULT TO SOCIAL WORK    Patient is admitted as inpatient status because of co-morbidities listed above, severity of signs and symptoms as outlined, requirement for current medical therapies and most importantly because of direct risk to patient if care not provided in a hospital setting. Mel Simon MD  2/1/2018  8:33 AM    Copy sent to Dr. Lee primary care provider on file.

## 2018-02-01 NOTE — PLAN OF CARE
Problem: Restraint Use - Violent/Self-Destructive Behavior:  Goal: Absence of restraint indications  Absence of restraint indications   Outcome: Met This Shift  Pt. Needed restraints this morning, was violent toward staff and a harm to self, blood alcohol level was elevated. Restraints were discontinued today. Problem: Falls - Risk of  Goal: Absence of falls  Outcome: Ongoing  Pt remains free of falls at this time. Bed locked in lowest position, siderails x2, call light in reach. Encouraged pt to call for assistance as needed for safety. Falling star posted outside of room. Will continue to monitor. Problem: Risk for Impaired Skin Integrity  Goal: Tissue integrity - skin and mucous membranes  Structural intactness and normal physiological function of skin and  mucous membranes. Outcome: Ongoing  Pts skin maintains structural intactness and physiologic function. Pt able to reposition independently in bed/ Assisting pt with turns every 2 hours and heels elevated off bed. Linens remain clean and dry. Will continue to monitor.

## 2018-02-01 NOTE — PROGRESS NOTES
0600 pt to room via stretcher, 4 point violent restraints in place, net smith on. Security to bedside. Pt oriented to room with no evidence of learning. Guard at bedside, telesitter in place. Vital signs taken. Will continue to monitor.

## 2018-02-01 NOTE — CONSULTS
[facility], NEUROLOGY                                                                NEUROLOGY CONSULT  NOTE      PATIENT NAME: Jodi Wilder   PATIENT MRN: [id number]   PRIMARY CARE PHYSICIAN: No primary care provider on file. DATE OF SERVICE: 2/1/2018    CHIEF COMPLAINT: I was drunk    HPI: Jodi Wilder is a 38 y.o. RH WM with heavy drinking, smoking, who was admitted for alcohol intoxication. Per chart, pt was found in his apartment altered, had drinks last night. When he was brought ED, he was belligerent, uncooperative, combative (when asked how much he drank, he said [place] lot\"). In ED, he was given ativan and haldol for calming him down, he has no seizure like activities, no tongue biting/incontinence. CTH in Ed detected hypodense area in the left cerebellar concened for infarct or mass. His alcohol level was 226. Social: single, heavy drinker (had xanax and bourbon last night), he recently moved to [facility] from [facility]. He was adopted, family history unknown. He works at "[employer]. Psychiatric: admits \"seasonal depression\", not on medication      PAST MEDICAL HISTORY:         Diagnosis Date    Asthma     Caffeine use     3 COFFEE/DAY        PAST SURGICAL HISTORY:         Procedure Laterality Date    KNEE SURGERY          SOCAIL HISTORY:     Social History     Social History    Marital status: Single     Spouse name: N/A    Number of children: N/A    Years of education: N/A     Occupational History    Not on file.      Social History Main Topics    Smoking status: Current Every Day Smoker    Smokeless tobacco: Current User    Alcohol use No    Drug use: No    Sexual activity: Yes     Other Topics Concern    Not on file     Social History Narrative    No narrative on file       MEDICATIONS:     Current Facility-Administered Medications   Medication Dose Route Frequency Provider Last mL/hr at 02/01/18 0704          ALLERGIES:   No Known Allergies    REVIEW OF SYSTEMS:        CONSTITUTIONAL Weight change: absent, Appetite change: absent, Fatigue: absent    HEENT Ears: normal, Visual disturbance: absent    RESPIRATORY Shortness of breath: absent, Cough: absent    CARDIOVASCULAR Chest pain: absent, Leg swelling :absent    GI Constipation: absent, Diarrhea: absent, Swallowing change: absent     Urinary frequency: absent, Urinary urgency: absent, Urinary incontinence: absent    MUSCULOSKELETAL Neck pain: absent, Back pain: absent, Stiffness: absent, Muscle pain: absent, Joint pain: absent Restless legs: absent    DERMATOLOGIC Hair loss: absent, Skin changes: absent    NEUROLOGIC Memory loss: absent, Confusion: present, Seizures: absent Trouble walking or imbalance: absent, Dizziness: absent, Weakness: absent, Numbness: absent Tremor: absent, Spasm: absent, Speech difficulty: absent, Headache: absent, Light sensitivity: absent    PSYCHIATRIC Anxiety: absent, Hallucination: absent, Mood disorder: absent    HEMATOLOGIC Abnormal bleeding: absent, Anemia: absent, Clotting disorder: absent, Lymph gland changes: absent     VITALS  BP (!) 107/56   Pulse 86   Temp 98.9 °F (37.2 °C) (Oral)   Resp 18   Wt 181 lb 7 oz (82.3 kg)   SpO2 96%   BMI 24.61 kg/m²      PHYSICAL EXAMINATION:     General appearance: cooperative  Skin: no rash or skin lesions. HEENT: normocephalic  Optic Fundi: deferred  Neck: supple, no cervcical adenopathy or carotid bruit  Lungs: clear to auscultation  Heart: Regular rate and rhythm, normal S1, S2. No murmurs, clicks or gallops. Peripheral pulses: radial pulses palpable  Abdominal: BS present, soft, NT, ND  Extremities: no edema    NEUROLOGICAL EXAMINATION:     MS: awake, alert and oriented. No aphasia, dysarthria, or neglect  CNs: PERRLA, EOMI, VF full, sensation intact, face symmetric, hearing intact, soft palate rises on phonation, sternocleidomastoid and trapezius intact.

## 2018-02-01 NOTE — H&P
Department of Endovascular Neurosurgery                                         Resident Consult Note    Reason for Consult:  Possible L cerebelar infarct  Requesting Physician:  Dr. Federico Thurman  Endovascular Neurosurgeon:   []Dr. Jonathan Otoole  [x]Dr. Evaristo Bates     History Obtained From:  electronic medical record    CHIEF COMPLAINT:       Manic behavior    HISTORY OF PRESENT ILLNESS:       The patient is a 38 y.o. male who was apparently found altered in his apartment. Per TPD patient was running around is under at his apartment complex behaving erratically. On arrival to the ED, patient was belligerent, uncooperative, fighting, kicking, and spitting, threatening the staff. Patient was given Ativan and Haldol with improvement in symptoms. Patient denied any drug use and due to altered mental status was not able to give much history when being examined by the ED physician. At that time patient did not have any complaints. Evaluation patient is sleeping and cannot be aroused. All other history could be obtained at this time. In the ED patient had a CT head done given his altered mental status and was found to have a hypodense focus in the left cerebellar hemisphere concerning for infarct versus intracranial mass. Patient also found to have elevated CK, myoglobin, lactic acid, and creatinine elevated at 1.38. Patient also had an elevated alcohol level above 300. Was given 2 L boluses of normal saline. PAST MEDICAL HISTORY :       Past Medical History:        Diagnosis Date    Asthma     Caffeine use     3 COFFEE/DAY       Past Surgical History:        Procedure Laterality Date    KNEE SURGERY         Social History:   Social History     Social History    Marital status: Single     Spouse name: N/A    Number of children: N/A    Years of education: N/A     Occupational History    Not on file.      Social History Main Topics    Smoking status: Current Every Day Smoker    Smokeless tobacco: 40 mg, 40 mg, Subcutaneous, Daily  nicotine (NICODERM CQ) 21 MG/24HR 1 patch, 1 patch, Transdermal, Daily  LORazepam (ATIVAN) injection 2 mg, 2 mg, Intravenous, Q4H PRN  LORazepam (ATIVAN) tablet 1 mg, 1 mg, Oral, Q1H PRN **OR** LORazepam (ATIVAN) injection 1 mg, 1 mg, Intravenous, Q1H PRN **OR** LORazepam (ATIVAN) tablet 2 mg, 2 mg, Oral, Q1H PRN **OR** LORazepam (ATIVAN) injection 2 mg, 2 mg, Intravenous, Q1H PRN **OR** LORazepam (ATIVAN) tablet 3 mg, 3 mg, Oral, Q1H PRN **OR** LORazepam (ATIVAN) injection 3 mg, 3 mg, Intravenous, Q1H PRN **OR** LORazepam (ATIVAN) tablet 4 mg, 4 mg, Oral, Q1H PRN **OR** LORazepam (ATIVAN) injection 4 mg, 4 mg, Intravenous, Q1H PRN  haloperidol lactate (HALDOL) injection 2 mg, 2 mg, Intramuscular, Q6H PRN  0.9 % sodium chloride infusion, , Intravenous, Continuous    REVIEW OF SYSTEMS:       Unable to obtain given patient is sedated    Review of systems otherwise negative. PHYSICAL EXAM:       BP (!) 149/75   Pulse 70   Temp 97.6 °F (36.4 °C) (Oral)   Resp 17   SpO2 96%     Unable to obtain a full exam given that patient is sedated. Patient is able to maintain airway with stable vitals on monitor.         LABS AND IMAGING:     CBC with Differential:    Lab Results   Component Value Date    WBC 8.7 02/01/2018    RBC 5.52 02/01/2018    HGB 15.8 02/01/2018    HCT 47.7 02/01/2018     02/01/2018    MCV 86.4 02/01/2018    MCH 28.6 02/01/2018    MCHC 33.1 02/01/2018    RDW 12.9 02/01/2018    LYMPHOPCT 44 02/01/2018    MONOPCT 7 02/01/2018    BASOPCT 1 02/01/2018    MONOSABS 0.62 02/01/2018    LYMPHSABS 3.84 02/01/2018    EOSABS 0.16 02/01/2018    BASOSABS 0.04 02/01/2018    DIFFTYPE NOT REPORTED 02/01/2018     BMP:    Lab Results   Component Value Date     02/01/2018    K 5.3 02/01/2018     02/01/2018    CO2 21 02/01/2018    BUN 19 02/01/2018    LABALBU 4.9 02/01/2018    CREATININE 1.38 02/01/2018    CALCIUM 9.1 02/01/2018    GFRAA >60 02/01/2018    LABGLOM 57

## 2018-02-04 NOTE — DISCHARGE SUMMARY
patient is sleeping and cannot be aroused. All other history could be obtained at this time. In the ED patient had a CT head done given his altered mental status and was found to have a hypodense focus in the left cerebellar hemisphere concerning for infarct versus intracranial mass.  Patient also found to have elevated CK, myoglobin, lactic acid, and creatinine elevated at 1.38.  Patient also had an elevated alcohol level above 300.  Was given 2 L boluses of normal saline. \"     In the ED, he was seen by the endovascular team as well as Neurosurgery. Given 2 g magnesium sulfate IV, folic acid, thiamine, dlantin loading dose. MRI and EEG ordered.      During our encounter, patient was in 4 point leather restraints as he had arrived to the unit kicking, biting, and displaying acute aggression. During encounter, sitter was by bedside. Patient relayed that he recently moved to [facility] from [facility]. He has no family here. He has no chronic medical issues and is on no home medications. He was adopted and unaware of any family history of psychiatric or neurologic conditions. He took xanax and bourbon last night. He denied taking any recreational drugs and has never done so in the past. He denied Heroin, cocaine, PCP, [place] salt use now or in the past. He does not have a family doctor. He saw a chiropractor yesterday for injuries sustained while dead-lifting. Instructed on better form. He denies any previous head trauma, MVA, concussion, stroke in past.       Significant therapeutic interventions:     Acute Alcohol Intoxication. Ethanol of 226. Percent: 0.226. Awaiting repeat level. Patient acutely agitated/violent. In 4 point violent restraints. U tox negative, though patient admits to having had xanax. PRN haldol and ativan for agitation/seziures. Sitter by bedside. Loaded with dilantin. EEG monitoring.      New Finding of cerebellar lesion. Acute/subacute stroke vs mass. Neurology consulted.  MRI pending.      Elevated Lactic

## 2018-02-20 ENCOUNTER — OFFICE VISIT (OUTPATIENT)
Dept: INTERNAL MEDICINE | Age: 43
End: 2018-02-20
Payer: COMMERCIAL

## 2018-02-20 VITALS
HEART RATE: 80 BPM | DIASTOLIC BLOOD PRESSURE: 88 MMHG | BODY MASS INDEX: 23.98 KG/M2 | SYSTOLIC BLOOD PRESSURE: 139 MMHG | HEIGHT: 72 IN | WEIGHT: 177 LBS

## 2018-02-20 DIAGNOSIS — R73.03 PREDIABETES: ICD-10-CM

## 2018-02-20 DIAGNOSIS — G93.89 CEREBELLAR MASS: Primary | ICD-10-CM

## 2018-02-20 DIAGNOSIS — R79.89 ELEVATED SERUM CREATININE: ICD-10-CM

## 2018-02-20 DIAGNOSIS — F10.20 ALCOHOLISM (HCC): ICD-10-CM

## 2018-02-20 DIAGNOSIS — Z00.00 HEALTHCARE MAINTENANCE: ICD-10-CM

## 2018-02-20 PROBLEM — R41.82 ALTERED MENTAL STATUS: Status: RESOLVED | Noted: 2018-02-01 | Resolved: 2018-02-20

## 2018-02-20 PROBLEM — Z30.09 CONSULTATION FOR STERILIZATION: Status: RESOLVED | Noted: 2017-08-28 | Resolved: 2018-02-20

## 2018-02-20 PROBLEM — F10.221 ACUTE ALCOHOL INTOXICATION DELIRIUM WITH MODERATE OR SEVERE USE DISORDER (HCC): Status: RESOLVED | Noted: 2018-02-01 | Resolved: 2018-02-20

## 2018-02-20 PROBLEM — R45.6 VIOLENT BEHAVIOR: Status: RESOLVED | Noted: 2018-02-01 | Resolved: 2018-02-20

## 2018-02-20 PROCEDURE — 99203 OFFICE O/P NEW LOW 30 MIN: CPT | Performed by: STUDENT IN AN ORGANIZED HEALTH CARE EDUCATION/TRAINING PROGRAM

## 2018-02-20 ASSESSMENT — PATIENT HEALTH QUESTIONNAIRE - PHQ9
SUM OF ALL RESPONSES TO PHQ QUESTIONS 1-9: 0
1. LITTLE INTEREST OR PLEASURE IN DOING THINGS: 0
9. THOUGHTS THAT YOU WOULD BE BETTER OFF DEAD, OR OF HURTING YOURSELF: 0
SUM OF ALL RESPONSES TO PHQ9 QUESTIONS 1 & 2: 0
6. FEELING BAD ABOUT YOURSELF - OR THAT YOU ARE A FAILURE OR HAVE LET YOURSELF OR YOUR FAMILY DOWN: 0
SUM OF ALL RESPONSES TO PHQ QUESTIONS 1-9: 0
1. LITTLE INTEREST OR PLEASURE IN DOING THINGS: 0
4. FEELING TIRED OR HAVING LITTLE ENERGY: 0
5. POOR APPETITE OR OVEREATING: 0
SUM OF ALL RESPONSES TO PHQ9 QUESTIONS 1 & 2: 0
8. MOVING OR SPEAKING SO SLOWLY THAT OTHER PEOPLE COULD HAVE NOTICED. OR THE OPPOSITE, BEING SO FIGETY OR RESTLESS THAT YOU HAVE BEEN MOVING AROUND A LOT MORE THAN USUAL: 0
2. FEELING DOWN, DEPRESSED OR HOPELESS: 0
2. FEELING DOWN, DEPRESSED OR HOPELESS: 0
3. TROUBLE FALLING OR STAYING ASLEEP: 0
7. TROUBLE CONCENTRATING ON THINGS, SUCH AS READING THE NEWSPAPER OR WATCHING TELEVISION: 0
10. IF YOU CHECKED OFF ANY PROBLEMS, HOW DIFFICULT HAVE THESE PROBLEMS MADE IT FOR YOU TO DO YOUR WORK, TAKE CARE OF THINGS AT HOME, OR GET ALONG WITH OTHER PEOPLE: 0

## 2018-02-20 NOTE — PROGRESS NOTES
[facility]    New Patient Note/History and Physical    Date of patient's visit: 2/20/2018  Name:  Rush Irizarry  Primary Care Physician: Connor Hoyt MD    Reason for visit: First Visit, establish care     HISTORY OF PRESENTING ILLNESS:    History was obtained from the patient. Rush Irizarry is a 38 y.o. is here to establish care. Patient was recently seen in the hospital for acute altered mental status. He was found to be intoxicated secondary to alcohol use. He was Mayda Clam, had rhabdomyolysis and mild acute kidney injury which seemed to have resolved. Incidentally a left cerebellar mass was discovered on a CT scan. He is supposed to get an MRI brain now evaluated further and has appointments with neurology. Patient says that he has been screened for HIV and gets it done every year and he is negative. Does not want to get it done now. Patient chills tobacco.  I counseled him about the bad effects of tobacco.  Currently patient is doing well and does not have any symptoms at all. PAST MEDICAL HISTORY:          Diagnosis Date    Asthma     Caffeine use     3 COFFEE/DAY       PAST SURGICAL HISTORY:          Procedure Laterality Date    KNEE SURGERY         ALLERGIES:    No Known Allergies      HOME MEDICATION:      No current outpatient prescriptions on file prior to visit. No current facility-administered medications on file prior to visit. SOCIAL HISTORY:    TOBACCO:   reports that he has been smoking. He uses smokeless tobacco.  ETOH:   reports that he does not drink alcohol. DRUGS:  reports that he does not use drugs. OCCUPATION:      FAMILY HISTORY:    No family history on file.     REVIEW OF SYSTEMS:    ENT: negative  Respiratory: no cough, shortness of breath, or wheezing  Cardiovascular: no chest pain or dyspnea on exertion  Gastrointestinal: no abdominal pain, black or bloody stools  Neurological: no TIA or stroke symptoms  Endocrine:

## 2018-05-29 ENCOUNTER — TELEPHONE (OUTPATIENT)
Dept: INTERNAL MEDICINE | Age: 43
End: 2018-05-29

## 2018-06-08 ENCOUNTER — TELEPHONE (OUTPATIENT)
Dept: INTERNAL MEDICINE | Age: 43
End: 2018-06-08

## 2018-07-09 ENCOUNTER — TELEPHONE (OUTPATIENT)
Dept: INTERNAL MEDICINE | Age: 43
End: 2018-07-09

## 2018-07-09 NOTE — LETTER
[address]  [address] [facility] [address] [facility]  Phone: [phone]  Fax: [phone]    Kieran Jensen MD        July 9, 2018    [place] [address]      Dear Lizzie Colindres:    We are sending this letter because your PCP ordered [facility] for you to have done at your last visit here and they have not yet been completed. If you can please come to our office on the 2nd floor to  your orders to have them compelted. If you do not have a follow-up appointment scheduled you can either contact the office to make an appointment with us or you can make one when you come in to pick-up your orders. If you have any questions or concerns, please don't hesitate to call.     Sincerely,        Kieran Jensen MD

## 2018-07-27 ENCOUNTER — TELEPHONE (OUTPATIENT)
Dept: INTERNAL MEDICINE | Age: 43
End: 2018-07-27

## 2018-07-27 DIAGNOSIS — Z11.4 ENCOUNTER FOR SCREENING FOR HIV: Primary | ICD-10-CM

## 2018-08-01 NOTE — TELEPHONE ENCOUNTER
Please sign order for HIV and contact patient when done.   He would like to have this test with 2/2/18 blood work

## 2018-08-06 ENCOUNTER — HOSPITAL ENCOUNTER (OUTPATIENT)
Age: 43
Setting detail: SPECIMEN
Discharge: HOME | End: 2018-08-06
Payer: COMMERCIAL

## 2018-08-06 DIAGNOSIS — R79.89 ELEVATED SERUM CREATININE: ICD-10-CM

## 2018-08-06 DIAGNOSIS — Z11.4 ENCOUNTER FOR SCREENING FOR HIV: ICD-10-CM

## 2018-08-06 DIAGNOSIS — F10.20 ALCOHOLISM (HCC): ICD-10-CM

## 2018-08-06 LAB
ALBUMIN SERPL-MCNC: 4.9 G/DL (ref 3.5–5.2)
ALBUMIN/GLOBULIN RATIO: 1.6 (ref 1–2.5)
ALP BLD-CCNC: 50 U/L (ref 40–129)
ALT SERPL-CCNC: 21 U/L (ref 5–41)
ANION GAP SERPL CALCULATED.3IONS-SCNC: 14 MMOL/L (ref 9–17)
AST SERPL-CCNC: 23 U/L
BILIRUB SERPL-MCNC: 0.27 MG/DL (ref 0.3–1.2)
BUN BLDV-MCNC: 17 MG/DL (ref 6–20)
BUN/CREAT BLD: ABNORMAL (ref 9–20)
CALCIUM SERPL-MCNC: 9.6 MG/DL (ref 8.6–10.4)
CHLORIDE BLD-SCNC: 100 MMOL/L (ref 98–107)
CO2: 25 MMOL/L (ref 20–31)
CREAT SERPL-MCNC: 1.09 MG/DL (ref 0.7–1.2)
GFR AFRICAN AMERICAN: >60 ML/MIN
GFR NON-AFRICAN AMERICAN: >60 ML/MIN
GFR SERPL CREATININE-BSD FRML MDRD: ABNORMAL ML/MIN/{1.73_M2}
GFR SERPL CREATININE-BSD FRML MDRD: ABNORMAL ML/MIN/{1.73_M2}
GLUCOSE BLD-MCNC: 98 MG/DL (ref 70–99)
POTASSIUM SERPL-SCNC: 4.1 MMOL/L (ref 3.7–5.3)
SODIUM BLD-SCNC: 139 MMOL/L (ref 135–144)
TOTAL PROTEIN: 7.9 G/DL (ref 6.4–8.3)

## 2018-08-06 PROCEDURE — 87389 HIV-1 AG W/HIV-1&-2 AB AG IA: CPT

## 2018-08-06 PROCEDURE — 80053 COMPREHEN METABOLIC PANEL: CPT

## 2018-08-06 PROCEDURE — 36415 COLL VENOUS BLD VENIPUNCTURE: CPT

## 2018-08-07 LAB — HIV AG/AB: NONREACTIVE
